# Patient Record
Sex: MALE | URBAN - NONMETROPOLITAN AREA
[De-identification: names, ages, dates, MRNs, and addresses within clinical notes are randomized per-mention and may not be internally consistent; named-entity substitution may affect disease eponyms.]

---

## 2024-03-07 ENCOUNTER — APPOINTMENT (RX ONLY)
Dept: URBAN - NONMETROPOLITAN AREA CLINIC 16 | Facility: CLINIC | Age: 89
Setting detail: DERMATOLOGY
End: 2024-03-07

## 2024-03-07 PROBLEM — C44.619 BASAL CELL CARCINOMA OF SKIN OF LEFT UPPER LIMB, INCLUDING SHOULDER: Status: ACTIVE | Noted: 2024-03-07

## 2024-03-07 PROCEDURE — ? FULL BODY SKIN EXAM - DECLINED

## 2024-03-07 PROCEDURE — ? BIOPSY BY SHAVE METHOD AND DESTRUCTION

## 2024-03-07 PROCEDURE — 17266 DSTRJ MAL LES T/A/L >4.0 CM: CPT

## 2024-03-07 NOTE — PROCEDURE: BIOPSY BY SHAVE METHOD AND DESTRUCTION
Detail Level: Detailed
Biopsy Type: H and E
Biopsy Method: Dermablade
Bill As?: Malignant Destruction
Size Of Lesion In Cm (Optional): 5.5
Size Of Lesion After Curettage: 6
Anesthesia Type: 1% lidocaine with epinephrine
Hemostasis: Electrodesiccation
Number Of Curettages: 3
Wound Care: Petrolatum
Lab Facility: [id number]
Render Path Notes In Note?: No
Consent: Written consent was obtained and risks were reviewed including but not limited to scarring, infection, bleeding, scabbing, incomplete removal, nerve damage and allergy to anesthesia.
Post-Care Instructions: I reviewed with the patient in detail post-care instructions. Patient is to keep the biopsy site dry overnight, and then apply bacitracin twice daily until healed. Patient may apply hydrogen peroxide soaks to remove any crusting.
Notification Instructions: Patient will be notified of biopsy results. However, patient instructed to call the office if not contacted within 2 weeks.
Billing Type: Third-Party Bill

## 2024-04-19 ENCOUNTER — APPOINTMENT (RX ONLY)
Dept: URBAN - NONMETROPOLITAN AREA CLINIC 16 | Facility: CLINIC | Age: 89
Setting detail: DERMATOLOGY
End: 2024-04-19

## 2024-04-19 VITALS — HEIGHT: 62 IN | WEIGHT: 161 LBS

## 2024-04-19 DIAGNOSIS — Z85.828 PERSONAL HISTORY OF OTHER MALIGNANT NEOPLASM OF SKIN: ICD-10-CM | Status: IMPROVED

## 2024-04-19 PROCEDURE — ? COUNSELING

## 2024-04-19 PROCEDURE — ? FULL BODY SKIN EXAM - DECLINED

## 2024-04-19 PROCEDURE — ? TREATMENT REGIMEN

## 2024-04-19 PROCEDURE — 99213 OFFICE O/P EST LOW 20 MIN: CPT

## 2024-04-19 ASSESSMENT — LOCATION ZONE DERM: LOCATION ZONE: ARM

## 2024-04-19 ASSESSMENT — LOCATION DETAILED DESCRIPTION DERM: LOCATION DETAILED: LEFT POSTERIOR SHOULDER

## 2024-04-19 ASSESSMENT — LOCATION SIMPLE DESCRIPTION DERM: LOCATION SIMPLE: LEFT SHOULDER

## 2024-04-19 NOTE — PROCEDURE: TREATMENT REGIMEN
Detail Level: Zone
Plan: Pt denies any discomfort from this area and is unsure if it has Improved due to not being able to see it. \\n\\nHmb reassures pt that the area looks to have improved. Hmb gives pt options such as having it removed by ED&C or surgery and or sending pt for radiation. \\n \\nHmb suggests pt to keep the area covered with vasealine and to apply hydroctisone cream when needed. \\nPt decides he will continue to monitor the spot and will follow up in 6 months.
Otc Regimen: Hydrocortisone cream every other day and vaseline on off days